# Patient Record
Sex: FEMALE
[De-identification: names, ages, dates, MRNs, and addresses within clinical notes are randomized per-mention and may not be internally consistent; named-entity substitution may affect disease eponyms.]

---

## 2023-04-28 ENCOUNTER — NURSE TRIAGE (OUTPATIENT)
Dept: OTHER | Facility: CLINIC | Age: 23
End: 2023-04-28

## 2023-04-28 NOTE — TELEPHONE ENCOUNTER
Received call from Minerva Castillo at [facility] Name: Diana Oconnell MRN: [id number]    Subjective: Caller states \"I am having contractions and noticed blood and mucous discharge\"     Current Symptoms: Due date 5/3/23, started having contractions, 3 or 4 in past 3.5 hrs, feels like period contractions, noticed sticky mucous with blood on pantyliner, possible loss of fluid, unsure if it was urine    Onset: 3.5 hours ago; gradual    LMP or Due Date: 5/3/23    Fetal Movement: the patient states that the movement of the baby is decreased compared to normal    Active vaginal bleeding: spotting/blood mixed with mucous  # of pads/hour: n/a  Vaginal fluid/discharge: small Notes one gush of clear fluid, underwear were also wet when she just woke up. Contractions: Every 1 hours ; Mild;  about every hour    Sexual intercourse in last 2 days: NO     (# of pregnancies): 1  Para (# of live births): 0    Past complications with pregnancy/delivery: n/a    Triage indicates for patient to  Go to L&D now    Care advice provided, patient verbalizes understanding; denies any other questions or concerns; instructed to call back for any new or worsening symptoms. Patient/caller agrees to proceed to [facility] Department  Call to Jeanette yeh at [facility] [place] to give report.      This triage is a result of a call to the [address] [place] [address]    Reason for Disposition   Vaginal bleeding    Protocols used: Pregnancy - Rupture of Membranes Suspected-ADULT-AH